# Patient Record
Sex: MALE | Race: OTHER | HISPANIC OR LATINO | URBAN - METROPOLITAN AREA
[De-identification: names, ages, dates, MRNs, and addresses within clinical notes are randomized per-mention and may not be internally consistent; named-entity substitution may affect disease eponyms.]

---

## 2023-05-11 ENCOUNTER — INPATIENT (INPATIENT)
Facility: HOSPITAL | Age: 83
LOS: 2 days | Discharge: HOME | DRG: 603 | End: 2023-05-14
Attending: INTERNAL MEDICINE | Admitting: INTERNAL MEDICINE
Payer: MEDICARE

## 2023-05-11 VITALS
TEMPERATURE: 98 F | WEIGHT: 125 LBS | DIASTOLIC BLOOD PRESSURE: 65 MMHG | SYSTOLIC BLOOD PRESSURE: 121 MMHG | RESPIRATION RATE: 18 BRPM | OXYGEN SATURATION: 98 % | HEART RATE: 93 BPM

## 2023-05-11 DIAGNOSIS — L03.011 CELLULITIS OF RIGHT FINGER: ICD-10-CM

## 2023-05-11 LAB
ALBUMIN SERPL ELPH-MCNC: 3.8 G/DL — SIGNIFICANT CHANGE UP (ref 3.5–5)
ANION GAP SERPL CALC-SCNC: 7 MMOL/L — SIGNIFICANT CHANGE UP (ref 5–17)
APTT BLD: 37.9 SEC — HIGH (ref 27.5–35.5)
BASOPHILS # BLD AUTO: 0.05 K/UL — SIGNIFICANT CHANGE UP (ref 0–0.2)
BASOPHILS NFR BLD AUTO: 0.6 % — SIGNIFICANT CHANGE UP (ref 0–2)
BUN SERPL-MCNC: 28 MG/DL — HIGH (ref 7–18)
CALCIUM SERPL-MCNC: 9.7 MG/DL — SIGNIFICANT CHANGE UP (ref 8.4–10.5)
CHLORIDE SERPL-SCNC: 105 MMOL/L — SIGNIFICANT CHANGE UP (ref 96–108)
CO2 SERPL-SCNC: 25 MMOL/L — SIGNIFICANT CHANGE UP (ref 22–31)
EOSINOPHIL # BLD AUTO: 0.4 K/UL — SIGNIFICANT CHANGE UP (ref 0–0.5)
EOSINOPHIL NFR BLD AUTO: 4.7 % — SIGNIFICANT CHANGE UP (ref 0–6)
GLUCOSE SERPL-MCNC: 184 MG/DL — HIGH (ref 70–99)
HCT VFR BLD CALC: 43.2 % — SIGNIFICANT CHANGE UP (ref 39–50)
HGB BLD-MCNC: 14.4 G/DL — SIGNIFICANT CHANGE UP (ref 13–17)
IMM GRANULOCYTES NFR BLD AUTO: 1.2 % — HIGH (ref 0–0.9)
INR BLD: 1.11 RATIO — SIGNIFICANT CHANGE UP (ref 0.88–1.16)
LACTATE SERPL-SCNC: 1.9 MMOL/L — SIGNIFICANT CHANGE UP (ref 0.7–2)
LYMPHOCYTES # BLD AUTO: 1.51 K/UL — SIGNIFICANT CHANGE UP (ref 1–3.3)
LYMPHOCYTES # BLD AUTO: 17.7 % — SIGNIFICANT CHANGE UP (ref 13–44)
MCHC RBC-ENTMCNC: 29 PG — SIGNIFICANT CHANGE UP (ref 27–34)
MCHC RBC-ENTMCNC: 33.3 GM/DL — SIGNIFICANT CHANGE UP (ref 32–36)
MCV RBC AUTO: 86.9 FL — SIGNIFICANT CHANGE UP (ref 80–100)
MONOCYTES # BLD AUTO: 0.66 K/UL — SIGNIFICANT CHANGE UP (ref 0–0.9)
MONOCYTES NFR BLD AUTO: 7.7 % — SIGNIFICANT CHANGE UP (ref 2–14)
NEUTROPHILS # BLD AUTO: 5.82 K/UL — SIGNIFICANT CHANGE UP (ref 1.8–7.4)
NEUTROPHILS NFR BLD AUTO: 68.1 % — SIGNIFICANT CHANGE UP (ref 43–77)
NRBC # BLD: 0 /100 WBCS — SIGNIFICANT CHANGE UP (ref 0–0)
PLATELET # BLD AUTO: 250 K/UL — SIGNIFICANT CHANGE UP (ref 150–400)
POTASSIUM SERPL-MCNC: 4.4 MMOL/L — SIGNIFICANT CHANGE UP (ref 3.5–5.3)
POTASSIUM SERPL-SCNC: 4.4 MMOL/L — SIGNIFICANT CHANGE UP (ref 3.5–5.3)
PROTHROM AB SERPL-ACNC: 13.2 SEC — SIGNIFICANT CHANGE UP (ref 10.5–13.4)
RBC # BLD: 4.97 M/UL — SIGNIFICANT CHANGE UP (ref 4.2–5.8)
RBC # FLD: 14.2 % — SIGNIFICANT CHANGE UP (ref 10.3–14.5)
SODIUM SERPL-SCNC: 137 MMOL/L — SIGNIFICANT CHANGE UP (ref 135–145)
WBC # BLD: 8.54 K/UL — SIGNIFICANT CHANGE UP (ref 3.8–10.5)
WBC # FLD AUTO: 8.54 K/UL — SIGNIFICANT CHANGE UP (ref 3.8–10.5)

## 2023-05-11 PROCEDURE — 73140 X-RAY EXAM OF FINGER(S): CPT | Mod: 26,RT

## 2023-05-11 PROCEDURE — 99285 EMERGENCY DEPT VISIT HI MDM: CPT

## 2023-05-11 RX ORDER — VANCOMYCIN HCL 1 G
1000 VIAL (EA) INTRAVENOUS ONCE
Refills: 0 | Status: COMPLETED | OUTPATIENT
Start: 2023-05-11 | End: 2023-05-11

## 2023-05-11 RX ORDER — PIPERACILLIN AND TAZOBACTAM 4; .5 G/20ML; G/20ML
3.38 INJECTION, POWDER, LYOPHILIZED, FOR SOLUTION INTRAVENOUS ONCE
Refills: 0 | Status: COMPLETED | OUTPATIENT
Start: 2023-05-11 | End: 2023-05-11

## 2023-05-11 RX ORDER — KETOROLAC TROMETHAMINE 30 MG/ML
15 SYRINGE (ML) INJECTION ONCE
Refills: 0 | Status: DISCONTINUED | OUTPATIENT
Start: 2023-05-11 | End: 2023-05-11

## 2023-05-11 RX ADMIN — Medication 15 MILLIGRAM(S): at 23:24

## 2023-05-11 RX ADMIN — Medication 250 MILLIGRAM(S): at 23:24

## 2023-05-11 RX ADMIN — PIPERACILLIN AND TAZOBACTAM 200 GRAM(S): 4; .5 INJECTION, POWDER, LYOPHILIZED, FOR SOLUTION INTRAVENOUS at 23:24

## 2023-05-11 NOTE — ED PROVIDER NOTE - OBJECTIVE STATEMENT
83 yr old male with hx of HTn and HLD presents to ed c/o persistent right index finger swelling , redness with wound not healing for about 1 week. slightly improve with keflex and Bactroban ointment. pt injured it and noticed persistent pus.

## 2023-05-11 NOTE — ED PROVIDER NOTE - CLINICAL SUMMARY MEDICAL DECISION MAKING FREE TEXT BOX
83 yr old male with hx of HTn and HLD presents to ed c/o persistent right index finger swelling , redness with wound not healing for about 1 week. slightly improve with keflex and Bactroban ointment. pt injured it and noticed persistent pus.     failed outpt oral abx. no sign of [place]. labs, xr, abx, admit

## 2023-05-11 NOTE — ED PROVIDER NOTE - WR INTERPRETED BY 1
Niacinamide Counseling: I recommended taking niacin or niacinamide, also know as vitamin B3, twice daily. Recent evidence suggests that taking vitamin B3 (500 mg twice daily) can reduce the risk of actinic keratoses and non-melanoma skin cancers. Side effects of vitamin B3 include flushing and headache. Alexei Bailey Y

## 2023-05-11 NOTE — ED ADULT TRIAGE NOTE - CHIEF COMPLAINT QUOTE
right index finger swelling , redness with wound not healing for few days as per granddaughter , advised by PMD for labs and IV antibiotics

## 2023-05-11 NOTE — ED ADULT NURSE NOTE - CAS EDN DISCHARGE ASSESSMENT
PT ambulatory, no sign of distress noted./Alert and oriented to person, place and time/Awake/Symptoms improved

## 2023-05-11 NOTE — ED PROVIDER NOTE - MUSCULOSKELETAL, MLM
right index finger erythema, able to fully extend finger but pain with palpation to digit. no tracking or redness.

## 2023-05-11 NOTE — ED ADULT NURSE NOTE - NSFALLUNIVINTERV_ED_ALL_ED
Bed/Stretcher in lowest position, wheels locked, appropriate side rails in place/Call bell, personal items and telephone in reach/Instruct patient to call for assistance before getting out of bed/chair/stretcher/Non-slip footwear applied when patient is off stretcher/[place] to call system/Physically safe environment - no spills, clutter or unnecessary equipment/Purposeful proactive rounding/Room/bathroom lighting operational, light cord in reach

## 2023-05-11 NOTE — ED ADULT NURSE NOTE - OBJECTIVE STATEMENT
PT referred for IV abx, pt has pain, redness, swelling to right index  finger per granddaughter pt has  hx of DM  denies Fever, Chills

## 2023-05-12 DIAGNOSIS — E11.9 TYPE 2 DIABETES MELLITUS WITHOUT COMPLICATIONS: ICD-10-CM

## 2023-05-12 DIAGNOSIS — E78.5 HYPERLIPIDEMIA, UNSPECIFIED: ICD-10-CM

## 2023-05-12 DIAGNOSIS — Z29.9 ENCOUNTER FOR PROPHYLACTIC MEASURES, UNSPECIFIED: ICD-10-CM

## 2023-05-12 DIAGNOSIS — I10 ESSENTIAL (PRIMARY) HYPERTENSION: ICD-10-CM

## 2023-05-12 DIAGNOSIS — N17.9 ACUTE KIDNEY FAILURE, UNSPECIFIED: ICD-10-CM

## 2023-05-12 DIAGNOSIS — L03.90 CELLULITIS, UNSPECIFIED: ICD-10-CM

## 2023-05-12 DIAGNOSIS — Z90.49 ACQUIRED ABSENCE OF OTHER SPECIFIED PARTS OF DIGESTIVE TRACT: Chronic | ICD-10-CM

## 2023-05-12 LAB
ALBUMIN SERPL ELPH-MCNC: 3.6 G/DL — SIGNIFICANT CHANGE UP (ref 3.5–5)
ALP SERPL-CCNC: 85 U/L — SIGNIFICANT CHANGE UP (ref 40–120)
ALP SERPL-CCNC: 86 U/L — SIGNIFICANT CHANGE UP (ref 40–120)
ALT FLD-CCNC: 15 U/L DA — SIGNIFICANT CHANGE UP (ref 10–60)
ALT FLD-CCNC: 15 U/L DA — SIGNIFICANT CHANGE UP (ref 10–60)
ANION GAP SERPL CALC-SCNC: 6 MMOL/L — SIGNIFICANT CHANGE UP (ref 5–17)
AST SERPL-CCNC: 4 U/L — LOW (ref 10–40)
AST SERPL-CCNC: 8 U/L — LOW (ref 10–40)
BASOPHILS # BLD AUTO: 0.05 K/UL — SIGNIFICANT CHANGE UP (ref 0–0.2)
BASOPHILS NFR BLD AUTO: 0.6 % — SIGNIFICANT CHANGE UP (ref 0–2)
BILIRUB SERPL-MCNC: 0.4 MG/DL — SIGNIFICANT CHANGE UP (ref 0.2–1.2)
BILIRUB SERPL-MCNC: 0.6 MG/DL — SIGNIFICANT CHANGE UP (ref 0.2–1.2)
BUN SERPL-MCNC: 27 MG/DL — HIGH (ref 7–18)
CALCIUM SERPL-MCNC: 9.4 MG/DL — SIGNIFICANT CHANGE UP (ref 8.4–10.5)
CHLORIDE SERPL-SCNC: 104 MMOL/L — SIGNIFICANT CHANGE UP (ref 96–108)
CK SERPL-CCNC: 77 U/L — SIGNIFICANT CHANGE UP (ref 35–232)
CO2 SERPL-SCNC: 26 MMOL/L — SIGNIFICANT CHANGE UP (ref 22–31)
CREAT SERPL-MCNC: 1.43 MG/DL — HIGH (ref 0.5–1.3)
CREAT SERPL-MCNC: 1.49 MG/DL — HIGH (ref 0.5–1.3)
CRP SERPL-MCNC: 17 MG/L — HIGH
EGFR: 46 ML/MIN/1.73M2 — LOW
EGFR: 48 ML/MIN/1.73M2 — LOW
EOSINOPHIL # BLD AUTO: 0.24 K/UL — SIGNIFICANT CHANGE UP (ref 0–0.5)
EOSINOPHIL NFR BLD AUTO: 3 % — SIGNIFICANT CHANGE UP (ref 0–6)
ERYTHROCYTE [SEDIMENTATION RATE] IN BLOOD: 16 MM/HR — SIGNIFICANT CHANGE UP (ref 0–20)
GLUCOSE BLDC GLUCOMTR-MCNC: 123 MG/DL — HIGH (ref 70–99)
GLUCOSE BLDC GLUCOMTR-MCNC: 171 MG/DL — HIGH (ref 70–99)
GLUCOSE BLDC GLUCOMTR-MCNC: 188 MG/DL — HIGH (ref 70–99)
GLUCOSE SERPL-MCNC: 180 MG/DL — HIGH (ref 70–99)
HCT VFR BLD CALC: 45.5 % — SIGNIFICANT CHANGE UP (ref 39–50)
HGB BLD-MCNC: 14.8 G/DL — SIGNIFICANT CHANGE UP (ref 13–17)
IMM GRANULOCYTES NFR BLD AUTO: 0.7 % — SIGNIFICANT CHANGE UP (ref 0–0.9)
LYMPHOCYTES # BLD AUTO: 1.23 K/UL — SIGNIFICANT CHANGE UP (ref 1–3.3)
LYMPHOCYTES # BLD AUTO: 15.3 % — SIGNIFICANT CHANGE UP (ref 13–44)
MAGNESIUM SERPL-MCNC: 2.2 MG/DL — SIGNIFICANT CHANGE UP (ref 1.6–2.6)
MCHC RBC-ENTMCNC: 28.5 PG — SIGNIFICANT CHANGE UP (ref 27–34)
MCHC RBC-ENTMCNC: 32.5 GM/DL — SIGNIFICANT CHANGE UP (ref 32–36)
MCV RBC AUTO: 87.5 FL — SIGNIFICANT CHANGE UP (ref 80–100)
MONOCYTES # BLD AUTO: 0.48 K/UL — SIGNIFICANT CHANGE UP (ref 0–0.9)
MONOCYTES NFR BLD AUTO: 6 % — SIGNIFICANT CHANGE UP (ref 2–14)
NEUTROPHILS # BLD AUTO: 5.96 K/UL — SIGNIFICANT CHANGE UP (ref 1.8–7.4)
NEUTROPHILS NFR BLD AUTO: 74.4 % — SIGNIFICANT CHANGE UP (ref 43–77)
NRBC # BLD: 0 /100 WBCS — SIGNIFICANT CHANGE UP (ref 0–0)
PHOSPHATE SERPL-MCNC: 3.4 MG/DL — SIGNIFICANT CHANGE UP (ref 2.5–4.5)
PLATELET # BLD AUTO: 238 K/UL — SIGNIFICANT CHANGE UP (ref 150–400)
POTASSIUM SERPL-MCNC: 4 MMOL/L — SIGNIFICANT CHANGE UP (ref 3.5–5.3)
POTASSIUM SERPL-SCNC: 4 MMOL/L — SIGNIFICANT CHANGE UP (ref 3.5–5.3)
PROT SERPL-MCNC: 7.9 G/DL — SIGNIFICANT CHANGE UP (ref 6–8.3)
PROT SERPL-MCNC: 8 G/DL — SIGNIFICANT CHANGE UP (ref 6–8.3)
RBC # BLD: 5.2 M/UL — SIGNIFICANT CHANGE UP (ref 4.2–5.8)
RBC # FLD: 14.1 % — SIGNIFICANT CHANGE UP (ref 10.3–14.5)
SODIUM SERPL-SCNC: 136 MMOL/L — SIGNIFICANT CHANGE UP (ref 135–145)
WBC # BLD: 8.02 K/UL — SIGNIFICANT CHANGE UP (ref 3.8–10.5)
WBC # FLD AUTO: 8.02 K/UL — SIGNIFICANT CHANGE UP (ref 3.8–10.5)

## 2023-05-12 RX ORDER — ACETAMINOPHEN 500 MG
650 TABLET ORAL EVERY 6 HOURS
Refills: 0 | Status: DISCONTINUED | OUTPATIENT
Start: 2023-05-12 | End: 2023-05-14

## 2023-05-12 RX ORDER — SODIUM CHLORIDE 9 MG/ML
1000 INJECTION, SOLUTION INTRAVENOUS
Refills: 0 | Status: DISCONTINUED | OUTPATIENT
Start: 2023-05-12 | End: 2023-05-14

## 2023-05-12 RX ORDER — DEXTROSE 50 % IN WATER 50 %
25 SYRINGE (ML) INTRAVENOUS ONCE
Refills: 0 | Status: DISCONTINUED | OUTPATIENT
Start: 2023-05-12 | End: 2023-05-14

## 2023-05-12 RX ORDER — ENOXAPARIN SODIUM 100 MG/ML
40 INJECTION SUBCUTANEOUS EVERY 24 HOURS
Refills: 0 | Status: DISCONTINUED | OUTPATIENT
Start: 2023-05-12 | End: 2023-05-14

## 2023-05-12 RX ORDER — DEXTROSE 50 % IN WATER 50 %
15 SYRINGE (ML) INTRAVENOUS ONCE
Refills: 0 | Status: DISCONTINUED | OUTPATIENT
Start: 2023-05-12 | End: 2023-05-14

## 2023-05-12 RX ORDER — AMLODIPINE BESYLATE 2.5 MG/1
5 TABLET ORAL DAILY
Refills: 0 | Status: DISCONTINUED | OUTPATIENT
Start: 2023-05-12 | End: 2023-05-14

## 2023-05-12 RX ORDER — INSULIN LISPRO 100/ML
VIAL (ML) SUBCUTANEOUS
Refills: 0 | Status: DISCONTINUED | OUTPATIENT
Start: 2023-05-12 | End: 2023-05-14

## 2023-05-12 RX ORDER — ONDANSETRON 8 MG/1
4 TABLET, FILM COATED ORAL EVERY 8 HOURS
Refills: 0 | Status: DISCONTINUED | OUTPATIENT
Start: 2023-05-12 | End: 2023-05-14

## 2023-05-12 RX ORDER — LANOLIN ALCOHOL/MO/W.PET/CERES
3 CREAM (GRAM) TOPICAL AT BEDTIME
Refills: 0 | Status: DISCONTINUED | OUTPATIENT
Start: 2023-05-12 | End: 2023-05-14

## 2023-05-12 RX ORDER — VANCOMYCIN HCL 1 G
500 VIAL (EA) INTRAVENOUS EVERY 24 HOURS
Refills: 0 | Status: DISCONTINUED | OUTPATIENT
Start: 2023-05-12 | End: 2023-05-14

## 2023-05-12 RX ORDER — ASPIRIN/CALCIUM CARB/MAGNESIUM 324 MG
81 TABLET ORAL DAILY
Refills: 0 | Status: DISCONTINUED | OUTPATIENT
Start: 2023-05-12 | End: 2023-05-14

## 2023-05-12 RX ORDER — ATORVASTATIN CALCIUM 80 MG/1
20 TABLET, FILM COATED ORAL AT BEDTIME
Refills: 0 | Status: DISCONTINUED | OUTPATIENT
Start: 2023-05-12 | End: 2023-05-14

## 2023-05-12 RX ORDER — GLUCAGON INJECTION, SOLUTION 0.5 MG/.1ML
1 INJECTION, SOLUTION SUBCUTANEOUS ONCE
Refills: 0 | Status: DISCONTINUED | OUTPATIENT
Start: 2023-05-12 | End: 2023-05-14

## 2023-05-12 RX ORDER — INSULIN LISPRO 100/ML
VIAL (ML) SUBCUTANEOUS AT BEDTIME
Refills: 0 | Status: DISCONTINUED | OUTPATIENT
Start: 2023-05-12 | End: 2023-05-14

## 2023-05-12 RX ORDER — DEXTROSE 50 % IN WATER 50 %
12.5 SYRINGE (ML) INTRAVENOUS ONCE
Refills: 0 | Status: DISCONTINUED | OUTPATIENT
Start: 2023-05-12 | End: 2023-05-14

## 2023-05-12 RX ADMIN — Medication 100 MILLIGRAM(S): at 22:01

## 2023-05-12 RX ADMIN — ENOXAPARIN SODIUM 40 MILLIGRAM(S): 100 INJECTION SUBCUTANEOUS at 12:59

## 2023-05-12 RX ADMIN — ATORVASTATIN CALCIUM 20 MILLIGRAM(S): 80 TABLET, FILM COATED ORAL at 22:00

## 2023-05-12 RX ADMIN — Medication 1: at 17:53

## 2023-05-12 RX ADMIN — Medication 81 MILLIGRAM(S): at 12:59

## 2023-05-12 RX ADMIN — Medication 15 MILLIGRAM(S): at 05:27

## 2023-05-12 NOTE — PATIENT PROFILE ADULT - FALL HARM RISK - HARM RISK INTERVENTIONS

## 2023-05-12 NOTE — CONSULT NOTE ADULT - SUBJECTIVE AND OBJECTIVE BOX
HPI:  Patient is an 83-year-old male PMH HTN, DM, HLD, who comes in complaining of right index, finger, swelling, and purulent drainage.    Patient states that eight days ago he injured the medial aspect of the right index finger while trying to close the deodorant, got injured by the cap.    Since then, he developed a small cut that was associated with swelling throughout the forearm. Visited his PCP, who prescribed an antibiotic,  keflex and Bactroban ointment. Patient started taking the antibiotic two days ago, the swelling and redness around the arm reduce, however, the redness around her cut site persisted with small amount of purulent drainage from think his visit to the ED.   Denies any fever, chills, nausea, vomiting, denies pain in the finger    In the ED HD stable and afebrile  Exam, notable for small cut in the medial aspect of the index finger, with redness throughout the index finger and made your right hand.  Labs significant for RODOLFO BUN/creatinine, 28/1.4 (12 May 2023 01:48)      PAST MEDICAL & SURGICAL HISTORY:  HTN (hypertension)      HLD (hyperlipidemia)      DM (diabetes mellitus)      S/P cholecystectomy          No Known Allergies      Meds:  acetaminophen     Tablet .. 650 milliGRAM(s) Oral every 6 hours PRN  aluminum hydroxide/magnesium hydroxide/simethicone Suspension 30 milliLiter(s) Oral every 4 hours PRN  amLODIPine   Tablet 5 milliGRAM(s) Oral daily  aspirin  chewable 81 milliGRAM(s) Oral daily  atorvastatin 20 milliGRAM(s) Oral at bedtime  dextrose 5%. 1000 milliLiter(s) IV Continuous <Continuous>  dextrose 5%. 1000 milliLiter(s) IV Continuous <Continuous>  dextrose 50% Injectable 25 Gram(s) IV Push once  dextrose 50% Injectable 12.5 Gram(s) IV Push once  dextrose 50% Injectable 25 Gram(s) IV Push once  dextrose Oral Gel 15 Gram(s) Oral once PRN  enoxaparin Injectable 40 milliGRAM(s) SubCutaneous every 24 hours  glucagon  Injectable 1 milliGRAM(s) IntraMuscular once  insulin lispro (ADMELOG) corrective regimen sliding scale   SubCutaneous at bedtime  insulin lispro (ADMELOG) corrective regimen sliding scale   SubCutaneous three times a day before meals  melatonin 3 milliGRAM(s) Oral at bedtime PRN  ondansetron Injectable 4 milliGRAM(s) IV Push every 8 hours PRN  vancomycin  IVPB 500 milliGRAM(s) IV Intermittent every 24 hours      SOCIAL HISTORY:  Smoker:  YES / NO        PACK YEARS:                         WHEN QUIT?  ETOH use:  YES / NO               FREQUENCY / QUANTITY:  [place] Drug use:  YES / NO  Occupation:  Assisted device use (Cane / Walker):  Live with:    FAMILY HISTORY:  No pertinent family history in first degree relatives        VITALS:  Vital Signs Last 24 Hrs  T(C): 36.1 (12 May 2023 12:10), Max: 36.4 (11 May 2023 20:36)  T(F): 97 (12 May 2023 12:10), Max: 97.5 (11 May 2023 20:36)  HR: 79 (12 May 2023 12:10) (74 - 93)  BP: 134/68 (12 May 2023 12:10) (119/63 - 152/73)  BP(mean): --  RR: 15 (12 May 2023 12:10) (15 - 18)  SpO2: 98% (12 May 2023 12:10) (97% - 100%)    Parameters below as of 12 May 2023 12:10  Patient On (Oxygen Delivery Method): room air        LABS/DIAGNOSTIC TESTS:                          14.8   8.02  )-----------( 238      ( 12 May 2023 10:20 )             45.5     WBC Count: 8.02 K/uL (05-12 @ 10:20)  WBC Count: 8.54 K/uL (05-11 @ 23:15)      05-12    136  |  104  |  27<H>  ----------------------------<  180<H>  4.0   |  26  |  1.49<H>    Ca    9.4      12 May 2023 10:20  Phos  3.4     05-12  Mg     2.2     05-12    TPro  7.9  /  Alb  3.6  /  TBili  0.6  /  DBili  x   /  AST  8<L>  /  ALT  15  /  AlkPhos  85  05-12          LIVER FUNCTIONS - ( 12 May 2023 10:20 )  Alb: 3.6 g/dL / Pro: 7.9 g/dL / ALK PHOS: 85 U/L / ALT: 15 U/L DA / AST: 8 U/L / GGT: x             PT/INR - ( 11 May 2023 23:15 )   PT: 13.2 sec;   INR: 1.11 ratio         PTT - ( 11 May 2023 23:15 )  PTT:37.9 sec    LACTATE:Lactate, Blood: 1.9 mmol/L (05-11 @ 23:15)      ABG -     CULTURES:       RADIOLOGY:< from: Xray Finger, Right Hand (05.11.23 @ 23:16) >  ACC: [id number] EXAM:  XR FINGER(S) MIN 2 VIEWS RT   ORDERED BY: BRYANNA GIBSON     PROCEDURE DATE:  05/11/2023          INTERPRETATION:  XR FINGERS HAND RIGHT    Clinical History: Index finger abscess    AP and lateral view right second digit      FINDINGS:    There is no fracture, dislocation or joint effusion.  No degenerative changes.  No radiopaque foreign body.    IMPRESSION:  1.  No fracture.  2.  Moderate generalized swelling. No subcutaneous emphysema or plain   film evidence of osteomyelitis.    --- End of Report ---             MARK ANTHONY JONES DO; Attending Radiologist  This document has been electronically signed. May 12 2023  9:46AM    < end of copied text >        ROS  [  ] UNABLE TO ELICIT               HPI:  Patient is an 83-year-old male PMH HTN, DM, HLD, who comes in complaining of right index, finger, swelling, and purulent drainage.    Patient states that eight days ago he injured the medial aspect of the right index finger while trying to close the deodorant, got injured by the cap.    Since then, he developed a small cut that was associated with swelling throughout the forearm. Visited his PCP, who prescribed an antibiotic,  keflex and Bactroban ointment. Patient started taking the antibiotic two days ago, the swelling and redness around the arm reduce, however, the redness around her cut site persisted with small amount of purulent drainage from think his visit to the ED.   Denies any fever, chills, nausea, vomiting, denies pain in the finger    In the ED HD stable and afebrile  Exam, notable for small cut in the medial aspect of the index finger, with redness throughout the index finger and made your right hand.  Labs significant for RODOLFO BUN/creatinine, 28/1.4 (12 May 2023 01:48)        History as above, asked to see this patient who presented to the hospital with a right index finger cellulitis and ulcer after injuring his finger about 1 week before coming to the hospital. He has pain  in the finger and difficulty bending it. He has no fevers or chills, no other complaints.        PAST MEDICAL & SURGICAL HISTORY:  HTN (hypertension)      HLD (hyperlipidemia)      DM (diabetes mellitus)      S/P cholecystectomy          No Known Allergies      Meds:  acetaminophen     Tablet .. 650 milliGRAM(s) Oral every 6 hours PRN  aluminum hydroxide/magnesium hydroxide/simethicone Suspension 30 milliLiter(s) Oral every 4 hours PRN  amLODIPine   Tablet 5 milliGRAM(s) Oral daily  aspirin  chewable 81 milliGRAM(s) Oral daily  atorvastatin 20 milliGRAM(s) Oral at bedtime  dextrose 5%. 1000 milliLiter(s) IV Continuous <Continuous>  dextrose 5%. 1000 milliLiter(s) IV Continuous <Continuous>  dextrose 50% Injectable 25 Gram(s) IV Push once  dextrose 50% Injectable 12.5 Gram(s) IV Push once  dextrose 50% Injectable 25 Gram(s) IV Push once  dextrose Oral Gel 15 Gram(s) Oral once PRN  enoxaparin Injectable 40 milliGRAM(s) SubCutaneous every 24 hours  glucagon  Injectable 1 milliGRAM(s) IntraMuscular once  insulin lispro (ADMELOG) corrective regimen sliding scale   SubCutaneous at bedtime  insulin lispro (ADMELOG) corrective regimen sliding scale   SubCutaneous three times a day before meals  melatonin 3 milliGRAM(s) Oral at bedtime PRN  ondansetron Injectable 4 milliGRAM(s) IV Push every 8 hours PRN  vancomycin  IVPB 500 milliGRAM(s) IV Intermittent every 24 hours      SOCIAL HISTORY:  Smoker:  no  ETOH use:  no      FAMILY HISTORY:  No pertinent family history in first degree relatives        VITALS:  Vital Signs Last 24 Hrs  T(C): 36.1 (12 May 2023 12:10), Max: 36.4 (11 May 2023 20:36)  T(F): 97 (12 May 2023 12:10), Max: 97.5 (11 May 2023 20:36)  HR: 79 (12 May 2023 12:10) (74 - 93)  BP: 134/68 (12 May 2023 12:10) (119/63 - 152/73)  BP(mean): --  RR: 15 (12 May 2023 12:10) (15 - 18)  SpO2: 98% (12 May 2023 12:10) (97% - 100%)    Parameters below as of 12 May 2023 12:10  Patient On (Oxygen Delivery Method): room air        LABS/DIAGNOSTIC TESTS:                          14.8   8.02  )-----------( 238      ( 12 May 2023 10:20 )             45.5     WBC Count: 8.02 K/uL (05-12 @ 10:20)  WBC Count: 8.54 K/uL (05-11 @ 23:15)      05-12    136  |  104  |  27<H>  ----------------------------<  180<H>  4.0   |  26  |  1.49<H>    Ca    9.4      12 May 2023 10:20  Phos  3.4     05-12  Mg     2.2     05-12    TPro  7.9  /  Alb  3.6  /  TBili  0.6  /  DBili  x   /  AST  8<L>  /  ALT  15  /  AlkPhos  85  05-12          LIVER FUNCTIONS - ( 12 May 2023 10:20 )  Alb: 3.6 g/dL / Pro: 7.9 g/dL / ALK PHOS: 85 U/L / ALT: 15 U/L DA / AST: 8 U/L / GGT: x             PT/INR - ( 11 May 2023 23:15 )   PT: 13.2 sec;   INR: 1.11 ratio         PTT - ( 11 May 2023 23:15 )  PTT:37.9 sec    LACTATE:Lactate, Blood: 1.9 mmol/L (05-11 @ 23:15)      ABG -     CULTURES:       RADIOLOGY:< from: Xray Finger, Right Hand (05.11.23 @ 23:16) >  ACC: [id number] EXAM:  XR FINGER(S) MIN 2 VIEWS RT   ORDERED BY: BRYANNA GIBSON     PROCEDURE DATE:  05/11/2023          INTERPRETATION:  XR FINGERS HAND RIGHT    Clinical History: Index finger abscess    AP and lateral view right second digit      FINDINGS:    There is no fracture, dislocation or joint effusion.  No degenerative changes.  No radiopaque foreign body.    IMPRESSION:  1.  No fracture.  2.  Moderate generalized swelling. No subcutaneous emphysema or plain   film evidence of osteomyelitis.    --- End of Report ---             MARK ANTHONY JONES DO; Attending Radiologist  This document has been electronically signed. May 12 2023  9:46AM    < end of copied text >        ROS  [  ] UNABLE TO ELICIT

## 2023-05-12 NOTE — H&P ADULT - PROBLEM SELECTOR PLAN 1
Comes in with  right index, finger, swelling, and purulent drainage. after injury 8 days ago  took keflex the last 2 days with minimal improvement    -admitted for IV abx,  -s/p vanc and zosyn in the ED  -f/u would cultures, blood culrures  -c/w abx vanc

## 2023-05-12 NOTE — PATIENT PROFILE ADULT - HAVE YOU RECENTLY LOST WEIGHT WITHOUT TRYING?
Called patient. No answer. Left message on answering machine to call back. PSR if patient calls back please give patient X-rays instructions below. TEAM: continue to check status of X-rays. No (0)

## 2023-05-12 NOTE — H&P ADULT - ASSESSMENT
Patient is an 83-year-old male PMH HTN, DM, HLD, who comes in complaining of right index, finger, swelling, and purulent drainage admitted for cellulitis

## 2023-05-12 NOTE — ED ADULT NURSE REASSESSMENT NOTE - NSFALLUNIVINTERV_ED_ALL_ED
Bed/Stretcher in lowest position, wheels locked, appropriate side rails in place/Call bell, personal items and telephone in reach/Instruct patient to call for assistance before getting out of bed/chair/stretcher/Non-slip footwear applied when patient is off stretcher/[place] to call system/Physically safe environment - no spills, clutter or unnecessary equipment/Purposeful proactive rounding/Room/bathroom lighting operational, light cord in reach

## 2023-05-12 NOTE — H&P ADULT - NSHPPHYSICALEXAM_GEN_ALL_CORE
T(C): 36.3 (05-12-23 @ 04:25), Max: 36.4 (05-11-23 @ 20:36)  T(F): 97.3 (05-12-23 @ 04:25), Max: 97.5 (05-11-23 @ 20:36)  HR: 74 (05-12-23 @ 04:25) (74 - 93)  BP: 119/63 (05-12-23 @ 04:25) (119/63 - 152/73)  RR: 18 (05-12-23 @ 04:25) (18 - 18)  SpO2: 99% (05-12-23 @ 04:25) (97% - 99%)    GENERAL: NAD, lying in bed comfortably  HEAD:  Atraumatic, Normocephalic  EYES: EOMI, PERRLA, conjunctiva and sclera clear  ENT: Moist mucous membranes  NECK: Supple, No JVD  CHEST/LUNG: Clear to auscultation bilaterally; No rales, rhonchi, wheezing, or rubs. Unlabored respirations  HEART: Regular rate and rhythm; No murmurs, rubs, or gallops  ABDOMEN: Bowel sounds present; Soft, Nontender, Nondistended. No hepatomegally  EXTREMITIES:  2+ Peripheral Pulses, brisk capillary refill. No clubbing, cyanosis, or edema  NERVOUS SYSTEM:  Alert & Oriented X3, speech clear. No deficits   MSK: FROM all 4 extremities, full and equal strength  SKIN: small cut in the medial aspect of the index finger, with redness throughout the index finger and made your right hand.

## 2023-05-12 NOTE — H&P ADULT - HISTORY OF PRESENT ILLNESS
Patient is an 83-year-old male PMH HTN, DM, HLD, who comes in complaining of right index, finger, swelling, and purulent drainage.    Patient states that eight days ago she injured the medial aspect of the right index finger while trying to close the deodorant, got injured by the cap.    Since then, he developed a small cut that was associated with swelling throughout the forearm. Visited his PCP, who prescribed an antibiotic,  keflex and Bactroban ointment. Patient started taking the antibiotic two days ago, the swelling and redness around the arm reduce, however, the redness around her cut site persisted with small amount of purulent drainage from think his visit to the ED.   Denies any fever, chills, nausea, vomiting, denies pain in the finger    In the ED HD stable and afebrile  Exam, notable for small cut in the medial aspect of the index finger, with redness throughout the index finger and made your right hand.  Labs significant for RODOLFO BUN/creatinine, 28/1.4 Patient is an 83-year-old male PMH HTN, DM, HLD, who comes in complaining of right index, finger, swelling, and purulent drainage.    Patient states that eight days ago he injured the medial aspect of the right index finger while trying to close the deodorant, got injured by the cap.    Since then, he developed a small cut that was associated with swelling throughout the forearm. Visited his PCP, who prescribed an antibiotic,  keflex and Bactroban ointment. Patient started taking the antibiotic two days ago, the swelling and redness around the arm reduce, however, the redness around her cut site persisted with small amount of purulent drainage from think his visit to the ED.   Denies any fever, chills, nausea, vomiting, denies pain in the finger    In the ED HD stable and afebrile  Exam, notable for small cut in the medial aspect of the index finger, with redness throughout the index finger and made your right hand.  Labs significant for RODOLFO BUN/creatinine, 28/1.4

## 2023-05-12 NOTE — CONSULT NOTE ADULT - ASSESSMENT
Right Index finger Cellulitis      Plan - Cont Vancomycin 500mgs iv q24hrs for now  if improving then will plan to DC home on Sunday on Doxycycline 100mgs po bid x 10 days

## 2023-05-12 NOTE — H&P ADULT - NSHPLABSRESULTS_GEN_ALL_CORE
14.4   8.54  )-----------( 250      ( 11 May 2023 23:15 )             43.2     05-11    137  |  105  |  28<H>  ----------------------------<  184<H>  4.4   |  25  |  1.43<H>    Ca    9.7      11 May 2023 23:15    TPro  8.0  /  Alb  3.8  /  TBili  0.4  /  DBili  x   /  AST  4<L>  /  ALT  15  /  AlkPhos  86  05-11    CARDIAC MARKERS ( 11 May 2023 23:15 )  x     / x     / 77 U/L / x     / x          LIVER FUNCTIONS - ( 11 May 2023 23:15 )  Alb: 3.8 g/dL / Pro: 8.0 g/dL / ALK PHOS: 86 U/L / ALT: 15 U/L DA / AST: 4 U/L / GGT: x           PT/INR - ( 11 May 2023 23:15 )   PT: 13.2 sec;   INR: 1.11 ratio         PTT - ( 11 May 2023 23:15 )  PTT:37.9 sec      Lactate, Blood: 1.9 mmol/L (05-11 @ 23:15)          RADIOLOGY STUDIES:    XR right hand: pending read

## 2023-05-12 NOTE — H&P ADULT - ATTENDING COMMENTS
Patient is an 83-year-old male PMH HTN, DM, HLD, who comes in complaining of right index, finger, swelling, and purulent drainage.    Patient states that eight days ago he injured the medial aspect of the right index finger while trying to close the deodorant, got injured by the cap.    Since then, he developed a small cut that was associated with swelling throughout the forearm. Visited his PCP, who prescribed an antibiotic,  keflex and Bactroban ointment. Patient started taking the antibiotic two days ago, the swelling and redness around the arm reduce, however, the redness around her cut site persisted with small amount of purulent drainage from think his visit to the ED.   Denies any fever, chills, nausea, vomiting, denies pain in the finger    In the ED HD stable and afebrile  Exam, notable for small cut in the medial aspect of the index finger, with redness throughout the index finger and made your right hand.  Labs significant for RODOLFO BUN/creatinine, 28/1.4      assessment  --  right index finger and hand cellulitis, rodolfo, h/o HTN, DM, HLD    plan  --  admit to med, vanco, lispro ss, hold metformin, cont preadmit home meds, gi and dvt prophylaxis, ivf   cbc, bmp, mg, phos, lipids, tsh, bld cx, wound cx,      id cons

## 2023-05-12 NOTE — H&P ADULT - PROBLEM SELECTOR PLAN 2
Comes in for cellulits  Found to have RODOLFO,   BUN / Cr ration > 20  likely pre-renal in the setting of infection  c/w gentle hydration  avoid NSAIDs and nephrotoxic agents , hold home metformin  monitor CMP daily for improvement.  f/u BMP daily

## 2023-05-13 LAB
A1C WITH ESTIMATED AVERAGE GLUCOSE RESULT: 7.1 % — HIGH (ref 4–5.6)
ANION GAP SERPL CALC-SCNC: 8 MMOL/L — SIGNIFICANT CHANGE UP (ref 5–17)
BUN SERPL-MCNC: 25 MG/DL — HIGH (ref 7–18)
CALCIUM SERPL-MCNC: 9.6 MG/DL — SIGNIFICANT CHANGE UP (ref 8.4–10.5)
CHLORIDE SERPL-SCNC: 107 MMOL/L — SIGNIFICANT CHANGE UP (ref 96–108)
CO2 SERPL-SCNC: 22 MMOL/L — SIGNIFICANT CHANGE UP (ref 22–31)
CREAT SERPL-MCNC: 1.28 MG/DL — SIGNIFICANT CHANGE UP (ref 0.5–1.3)
EGFR: 55 ML/MIN/1.73M2 — LOW
ESTIMATED AVERAGE GLUCOSE: 157 MG/DL — HIGH (ref 68–114)
GLUCOSE BLDC GLUCOMTR-MCNC: 123 MG/DL — HIGH (ref 70–99)
GLUCOSE BLDC GLUCOMTR-MCNC: 187 MG/DL — HIGH (ref 70–99)
GLUCOSE BLDC GLUCOMTR-MCNC: 204 MG/DL — HIGH (ref 70–99)
GLUCOSE BLDC GLUCOMTR-MCNC: 224 MG/DL — HIGH (ref 70–99)
GLUCOSE SERPL-MCNC: 136 MG/DL — HIGH (ref 70–99)
HCT VFR BLD CALC: 44.4 % — SIGNIFICANT CHANGE UP (ref 39–50)
HGB BLD-MCNC: 14.3 G/DL — SIGNIFICANT CHANGE UP (ref 13–17)
MCHC RBC-ENTMCNC: 28.4 PG — SIGNIFICANT CHANGE UP (ref 27–34)
MCHC RBC-ENTMCNC: 32.2 GM/DL — SIGNIFICANT CHANGE UP (ref 32–36)
MCV RBC AUTO: 88.1 FL — SIGNIFICANT CHANGE UP (ref 80–100)
NRBC # BLD: 0 /100 WBCS — SIGNIFICANT CHANGE UP (ref 0–0)
PLATELET # BLD AUTO: 239 K/UL — SIGNIFICANT CHANGE UP (ref 150–400)
POTASSIUM SERPL-MCNC: 4.2 MMOL/L — SIGNIFICANT CHANGE UP (ref 3.5–5.3)
POTASSIUM SERPL-SCNC: 4.2 MMOL/L — SIGNIFICANT CHANGE UP (ref 3.5–5.3)
RBC # BLD: 5.04 M/UL — SIGNIFICANT CHANGE UP (ref 4.2–5.8)
RBC # FLD: 13.9 % — SIGNIFICANT CHANGE UP (ref 10.3–14.5)
SODIUM SERPL-SCNC: 137 MMOL/L — SIGNIFICANT CHANGE UP (ref 135–145)
WBC # BLD: 6.8 K/UL — SIGNIFICANT CHANGE UP (ref 3.8–10.5)
WBC # FLD AUTO: 6.8 K/UL — SIGNIFICANT CHANGE UP (ref 3.8–10.5)

## 2023-05-13 PROCEDURE — 73200 CT UPPER EXTREMITY W/O DYE: CPT | Mod: 26,RT

## 2023-05-13 RX ADMIN — Medication 2: at 17:37

## 2023-05-13 RX ADMIN — Medication 81 MILLIGRAM(S): at 12:24

## 2023-05-13 RX ADMIN — Medication 2: at 12:23

## 2023-05-13 RX ADMIN — AMLODIPINE BESYLATE 5 MILLIGRAM(S): 2.5 TABLET ORAL at 06:12

## 2023-05-13 RX ADMIN — ATORVASTATIN CALCIUM 20 MILLIGRAM(S): 80 TABLET, FILM COATED ORAL at 21:50

## 2023-05-13 RX ADMIN — Medication 100 MILLIGRAM(S): at 21:50

## 2023-05-13 RX ADMIN — ENOXAPARIN SODIUM 40 MILLIGRAM(S): 100 INJECTION SUBCUTANEOUS at 12:24

## 2023-05-13 NOTE — PROGRESS NOTE ADULT - SUBJECTIVE AND OBJECTIVE BOX
Patient is a 83y old  Male who presents with a chief complaint of Index finger cellulitis (12 May 2023 18:32)    pt seen in icu [  ], reg med floor [   ], bed [  ], chair at bedside [   ], a+o x3 [  ], lethargic [  ],  nad [  ]    leonardo [  ], ngt [  ], peg [  ], et tube [  ], cent line [  ], picc line [  ]        Allergies    No Known Allergies        Vitals    T(F): 98.1 (05-13-23 @ 05:10), Max: 98.1 (05-13-23 @ 05:10)  HR: 75 (05-13-23 @ 05:10) (71 - 83)  BP: 120/59 (05-13-23 @ 05:10) (120/59 - 151/66)  RR: 18 (05-13-23 @ 05:10) (15 - 18)  SpO2: 100% (05-13-23 @ 05:10) (98% - 100%)  Wt(kg): --  CAPILLARY BLOOD GLUCOSE      POCT Blood Glucose.: 123 mg/dL (12 May 2023 21:37)      Labs                          14.8   8.02  )-----------( 238      ( 12 May 2023 10:20 )             45.5       05-12    136  |  104  |  27<H>  ----------------------------<  180<H>  4.0   |  26  |  1.49<H>    Ca    9.4      12 May 2023 10:20  Phos  3.4     05-12  Mg     2.2     05-12    TPro  7.9  /  Alb  3.6  /  TBili  0.6  /  DBili  x   /  AST  8<L>  /  ALT  15  /  AlkPhos  85  05-12      CARDIAC MARKERS ( 11 May 2023 23:15 )  x     / x     / 77 U/L / x     / x            .Blood Blood-Peripheral  05-11 @ 23:09   No growth to date.  --  --      .Blood Blood-Peripheral  05-11 @ 23:00   No growth to date.  --  --          Radiology Results      Meds    MEDICATIONS  (STANDING):  amLODIPine   Tablet 5 milliGRAM(s) Oral daily  aspirin  chewable 81 milliGRAM(s) Oral daily  atorvastatin 20 milliGRAM(s) Oral at bedtime  dextrose 5%. 1000 milliLiter(s) (50 mL/Hr) IV Continuous <Continuous>  dextrose 5%. 1000 milliLiter(s) (100 mL/Hr) IV Continuous <Continuous>  dextrose 50% Injectable 25 Gram(s) IV Push once  dextrose 50% Injectable 12.5 Gram(s) IV Push once  dextrose 50% Injectable 25 Gram(s) IV Push once  enoxaparin Injectable 40 milliGRAM(s) SubCutaneous every 24 hours  glucagon  Injectable 1 milliGRAM(s) IntraMuscular once  insulin lispro (ADMELOG) corrective regimen sliding scale   SubCutaneous three times a day before meals  insulin lispro (ADMELOG) corrective regimen sliding scale   SubCutaneous at bedtime  vancomycin  IVPB 500 milliGRAM(s) IV Intermittent every 24 hours      MEDICATIONS  (PRN):  acetaminophen     Tablet .. 650 milliGRAM(s) Oral every 6 hours PRN Temp greater or equal to 38C (100.4F), Mild Pain (1 - 3)  aluminum hydroxide/magnesium hydroxide/simethicone Suspension 30 milliLiter(s) Oral every 4 hours PRN Dyspepsia  dextrose Oral Gel 15 Gram(s) Oral once PRN Blood Glucose LESS THAN 70 milliGRAM(s)/deciliter  melatonin 3 milliGRAM(s) Oral at bedtime PRN Insomnia  ondansetron Injectable 4 milliGRAM(s) IV Push every 8 hours PRN Nausea and/or Vomiting      Physical Exam    Neuro :  no focal deficits  Respiratory: CTA B/L  CV: RRR, S1S2, no murmurs,   Abdominal: Soft, NT, ND +BS,  Extremities: No edema, + peripheral pulses    ASSESSMENT    right index finger and hand cellulitis,   kena,   h/o HTN, DM, HLD      Cellulitis of finger of right hand    HTN (hypertension)    HLD (hyperlipidemia)    DM (diabetes mellitus)    S/P cholecystectomy        PLAN    admit to med,   vanco,   lispro ss,   hold metformin,   cont preadmit home meds,   gi and dvt prophylaxis,   ivf   cbc,   bmp,   mg,   phos,   lipids,   tsh,   bld cx,   wound cx,      id cons .       Patient is a 83y old  Male who presents with a chief complaint of Index finger cellulitis (12 May 2023 18:32)    pt seen in icu [  ], reg med floor [  x ], bed [ x ], chair at bedside [   ], a+o x3 [x  ], lethargic [  ],  nad [ x ]      Allergies    No Known Allergies        Vitals    T(F): 98.1 (05-13-23 @ 05:10), Max: 98.1 (05-13-23 @ 05:10)  HR: 75 (05-13-23 @ 05:10) (71 - 83)  BP: 120/59 (05-13-23 @ 05:10) (120/59 - 151/66)  RR: 18 (05-13-23 @ 05:10) (15 - 18)  SpO2: 100% (05-13-23 @ 05:10) (98% - 100%)  Wt(kg): --  CAPILLARY BLOOD GLUCOSE      POCT Blood Glucose.: 123 mg/dL (12 May 2023 21:37)      Labs                          14.8   8.02  )-----------( 238      ( 12 May 2023 10:20 )             45.5       05-12    136  |  104  |  27<H>  ----------------------------<  180<H>  4.0   |  26  |  1.49<H>    Ca    9.4      12 May 2023 10:20  Phos  3.4     05-12  Mg     2.2     05-12    TPro  7.9  /  Alb  3.6  /  TBili  0.6  /  DBili  x   /  AST  8<L>  /  ALT  15  /  AlkPhos  85  05-12      CARDIAC MARKERS ( 11 May 2023 23:15 )  x     / x     / 77 U/L / x     / x            .Blood Blood-Peripheral  05-11 @ 23:09   No growth to date.  --  --      .Blood Blood-Peripheral  05-11 @ 23:00   No growth to date.  --  --          Radiology Results      Meds    MEDICATIONS  (STANDING):  amLODIPine   Tablet 5 milliGRAM(s) Oral daily  aspirin  chewable 81 milliGRAM(s) Oral daily  atorvastatin 20 milliGRAM(s) Oral at bedtime  dextrose 5%. 1000 milliLiter(s) (50 mL/Hr) IV Continuous <Continuous>  dextrose 5%. 1000 milliLiter(s) (100 mL/Hr) IV Continuous <Continuous>  dextrose 50% Injectable 25 Gram(s) IV Push once  dextrose 50% Injectable 12.5 Gram(s) IV Push once  dextrose 50% Injectable 25 Gram(s) IV Push once  enoxaparin Injectable 40 milliGRAM(s) SubCutaneous every 24 hours  glucagon  Injectable 1 milliGRAM(s) IntraMuscular once  insulin lispro (ADMELOG) corrective regimen sliding scale   SubCutaneous three times a day before meals  insulin lispro (ADMELOG) corrective regimen sliding scale   SubCutaneous at bedtime  vancomycin  IVPB 500 milliGRAM(s) IV Intermittent every 24 hours      MEDICATIONS  (PRN):  acetaminophen     Tablet .. 650 milliGRAM(s) Oral every 6 hours PRN Temp greater or equal to 38C (100.4F), Mild Pain (1 - 3)  aluminum hydroxide/magnesium hydroxide/simethicone Suspension 30 milliLiter(s) Oral every 4 hours PRN Dyspepsia  dextrose Oral Gel 15 Gram(s) Oral once PRN Blood Glucose LESS THAN 70 milliGRAM(s)/deciliter  melatonin 3 milliGRAM(s) Oral at bedtime PRN Insomnia  ondansetron Injectable 4 milliGRAM(s) IV Push every 8 hours PRN Nausea and/or Vomiting      Physical Exam    Neuro :  no focal deficits  Respiratory: CTA B/L  CV: RRR, S1S2, no murmurs,   Abdominal: Soft, NT, ND +BS,  Extremities: No edema, + peripheral pulses, right index finger erythematous, tender with pus      ASSESSMENT    right index finger cellulitis with possible abscess,   kena,   h/o HTN,   DM,   HLD  S/P cholecystectomy        PLAN    blood cx neg noted above  cont vanco,   id f/u  f/u wound cx   ct finger/ hand to r/o abscess   cont ivf  f/u labs   lispro ss,   hold metformin,   cont current meds

## 2023-05-14 VITALS
OXYGEN SATURATION: 100 % | HEART RATE: 72 BPM | RESPIRATION RATE: 17 BRPM | SYSTOLIC BLOOD PRESSURE: 132 MMHG | DIASTOLIC BLOOD PRESSURE: 73 MMHG | TEMPERATURE: 98 F

## 2023-05-14 LAB
ANION GAP SERPL CALC-SCNC: 3 MMOL/L — LOW (ref 5–17)
BUN SERPL-MCNC: 21 MG/DL — HIGH (ref 7–18)
CALCIUM SERPL-MCNC: 9.1 MG/DL — SIGNIFICANT CHANGE UP (ref 8.4–10.5)
CHLORIDE SERPL-SCNC: 107 MMOL/L — SIGNIFICANT CHANGE UP (ref 96–108)
CO2 SERPL-SCNC: 26 MMOL/L — SIGNIFICANT CHANGE UP (ref 22–31)
CREAT SERPL-MCNC: 1.4 MG/DL — HIGH (ref 0.5–1.3)
EGFR: 49 ML/MIN/1.73M2 — LOW
GLUCOSE BLDC GLUCOMTR-MCNC: 155 MG/DL — HIGH (ref 70–99)
GLUCOSE BLDC GLUCOMTR-MCNC: 255 MG/DL — HIGH (ref 70–99)
GLUCOSE SERPL-MCNC: 269 MG/DL — HIGH (ref 70–99)
HCT VFR BLD CALC: 42.9 % — SIGNIFICANT CHANGE UP (ref 39–50)
HGB BLD-MCNC: 14 G/DL — SIGNIFICANT CHANGE UP (ref 13–17)
MCHC RBC-ENTMCNC: 28.8 PG — SIGNIFICANT CHANGE UP (ref 27–34)
MCHC RBC-ENTMCNC: 32.6 GM/DL — SIGNIFICANT CHANGE UP (ref 32–36)
MCV RBC AUTO: 88.3 FL — SIGNIFICANT CHANGE UP (ref 80–100)
NRBC # BLD: 0 /100 WBCS — SIGNIFICANT CHANGE UP (ref 0–0)
PLATELET # BLD AUTO: 246 K/UL — SIGNIFICANT CHANGE UP (ref 150–400)
POTASSIUM SERPL-MCNC: 4.5 MMOL/L — SIGNIFICANT CHANGE UP (ref 3.5–5.3)
POTASSIUM SERPL-SCNC: 4.5 MMOL/L — SIGNIFICANT CHANGE UP (ref 3.5–5.3)
RBC # BLD: 4.86 M/UL — SIGNIFICANT CHANGE UP (ref 4.2–5.8)
RBC # FLD: 14 % — SIGNIFICANT CHANGE UP (ref 10.3–14.5)
SODIUM SERPL-SCNC: 136 MMOL/L — SIGNIFICANT CHANGE UP (ref 135–145)
WBC # BLD: 6.32 K/UL — SIGNIFICANT CHANGE UP (ref 3.8–10.5)
WBC # FLD AUTO: 6.32 K/UL — SIGNIFICANT CHANGE UP (ref 3.8–10.5)

## 2023-05-14 PROCEDURE — 86140 C-REACTIVE PROTEIN: CPT

## 2023-05-14 PROCEDURE — 73200 CT UPPER EXTREMITY W/O DYE: CPT

## 2023-05-14 PROCEDURE — 82962 GLUCOSE BLOOD TEST: CPT

## 2023-05-14 PROCEDURE — 83036 HEMOGLOBIN GLYCOSYLATED A1C: CPT

## 2023-05-14 PROCEDURE — 85610 PROTHROMBIN TIME: CPT

## 2023-05-14 PROCEDURE — 82550 ASSAY OF CK (CPK): CPT

## 2023-05-14 PROCEDURE — 73140 X-RAY EXAM OF FINGER(S): CPT

## 2023-05-14 PROCEDURE — 85730 THROMBOPLASTIN TIME PARTIAL: CPT

## 2023-05-14 PROCEDURE — 87040 BLOOD CULTURE FOR BACTERIA: CPT

## 2023-05-14 PROCEDURE — 96375 TX/PRO/DX INJ NEW DRUG ADDON: CPT

## 2023-05-14 PROCEDURE — 83605 ASSAY OF LACTIC ACID: CPT

## 2023-05-14 PROCEDURE — 85027 COMPLETE CBC AUTOMATED: CPT

## 2023-05-14 PROCEDURE — 85025 COMPLETE CBC W/AUTO DIFF WBC: CPT

## 2023-05-14 PROCEDURE — 85652 RBC SED RATE AUTOMATED: CPT

## 2023-05-14 PROCEDURE — 80048 BASIC METABOLIC PNL TOTAL CA: CPT

## 2023-05-14 PROCEDURE — 99285 EMERGENCY DEPT VISIT HI MDM: CPT

## 2023-05-14 PROCEDURE — 84100 ASSAY OF PHOSPHORUS: CPT

## 2023-05-14 PROCEDURE — 36415 COLL VENOUS BLD VENIPUNCTURE: CPT

## 2023-05-14 PROCEDURE — 80053 COMPREHEN METABOLIC PANEL: CPT

## 2023-05-14 PROCEDURE — 96374 THER/PROPH/DIAG INJ IV PUSH: CPT

## 2023-05-14 PROCEDURE — 83735 ASSAY OF MAGNESIUM: CPT

## 2023-05-14 RX ADMIN — Medication 81 MILLIGRAM(S): at 14:00

## 2023-05-14 RX ADMIN — ENOXAPARIN SODIUM 40 MILLIGRAM(S): 100 INJECTION SUBCUTANEOUS at 13:59

## 2023-05-14 RX ADMIN — AMLODIPINE BESYLATE 5 MILLIGRAM(S): 2.5 TABLET ORAL at 05:34

## 2023-05-14 NOTE — DISCHARGE NOTE PROVIDER - CARE PROVIDER_API CALL
Marcellus Killian  INTERNAL MEDICINE  [phone] [place] [address]  Phone: [phone]  Fax: [phone]  Established Patient  Follow Up Time: 1-3 days

## 2023-05-14 NOTE — PROGRESS NOTE ADULT - SUBJECTIVE AND OBJECTIVE BOX
Patient is a 83y old  Male who presents with a chief complaint of Index finger cellulitis (13 May 2023 06:17)    pt seen in icu [  ], reg med floor [  x ], bed [ x ], chair at bedside [   ], a+o x3 [x  ], lethargic [  ],    nad [ x ]      Allergies    No Known Allergies        Vitals    T(F): 97.4 (05-14-23 @ 05:25), Max: 97.6 (05-13-23 @ 11:18)  HR: 70 (05-14-23 @ 05:25) (67 - 74)  BP: 149/72 (05-14-23 @ 05:25) (131/94 - 149/72)  RR: 18 (05-14-23 @ 05:25) (18 - 18)  SpO2: 99% (05-14-23 @ 05:25) (99% - 100%)  Wt(kg): --  CAPILLARY BLOOD GLUCOSE      POCT Blood Glucose.: 187 mg/dL (13 May 2023 21:03)      Labs                          14.3   6.80  )-----------( 239      ( 13 May 2023 07:53 )             44.4       05-13    137  |  107  |  25<H>  ----------------------------<  136<H>  4.2   |  22  |  1.28    Ca    9.6      13 May 2023 07:53  Phos  3.4     05-12  Mg     2.2     05-12    TPro  7.9  /  Alb  3.6  /  TBili  0.6  /  DBili  x   /  AST  8<L>  /  ALT  15  /  AlkPhos  85  05-12            .Blood Blood-Peripheral  05-11 @ 23:09   No growth to date.  --  --      .Blood Blood-Peripheral  05-11 @ 23:00   No growth to date.  --  --          Radiology Results      Meds    MEDICATIONS  (STANDING):  amLODIPine   Tablet 5 milliGRAM(s) Oral daily  aspirin  chewable 81 milliGRAM(s) Oral daily  atorvastatin 20 milliGRAM(s) Oral at bedtime  dextrose 5%. 1000 milliLiter(s) (100 mL/Hr) IV Continuous <Continuous>  dextrose 5%. 1000 milliLiter(s) (50 mL/Hr) IV Continuous <Continuous>  dextrose 50% Injectable 25 Gram(s) IV Push once  dextrose 50% Injectable 12.5 Gram(s) IV Push once  dextrose 50% Injectable 25 Gram(s) IV Push once  enoxaparin Injectable 40 milliGRAM(s) SubCutaneous every 24 hours  glucagon  Injectable 1 milliGRAM(s) IntraMuscular once  insulin lispro (ADMELOG) corrective regimen sliding scale   SubCutaneous three times a day before meals  insulin lispro (ADMELOG) corrective regimen sliding scale   SubCutaneous at bedtime  vancomycin  IVPB 500 milliGRAM(s) IV Intermittent every 24 hours      MEDICATIONS  (PRN):  acetaminophen     Tablet .. 650 milliGRAM(s) Oral every 6 hours PRN Temp greater or equal to 38C (100.4F), Mild Pain (1 - 3)  aluminum hydroxide/magnesium hydroxide/simethicone Suspension 30 milliLiter(s) Oral every 4 hours PRN Dyspepsia  dextrose Oral Gel 15 Gram(s) Oral once PRN Blood Glucose LESS THAN 70 milliGRAM(s)/deciliter  melatonin 3 milliGRAM(s) Oral at bedtime PRN Insomnia  ondansetron Injectable 4 milliGRAM(s) IV Push every 8 hours PRN Nausea and/or Vomiting      Physical Exam      Neuro :  no focal deficits  Respiratory: CTA B/L  CV: RRR, S1S2, no murmurs,   Abdominal: Soft, NT, ND +BS,  Extremities: No edema, + peripheral pulses, right index finger erythematous, tender with pus      ASSESSMENT    right index finger cellulitis with possible abscess,   kena,   h/o HTN,   DM,   HLD  S/P cholecystectomy        PLAN    blood cx neg noted above  cont vanco,   id f/u  f/u wound cx   ct finger/ hand to r/o abscess   cont ivf  f/u labs   lispro ss,   hold metformin,   cont current meds          Patient is a 83y old  Male who presents with a chief complaint of Index finger cellulitis (13 May 2023 06:17)    pt seen in icu [  ], reg med floor [  x ], bed [ x ], chair at bedside [   ], a+o x3 [x  ], lethargic [  ],    nad [ x ]      Allergies    No Known Allergies        Vitals    T(F): 97.4 (05-14-23 @ 05:25), Max: 97.6 (05-13-23 @ 11:18)  HR: 70 (05-14-23 @ 05:25) (67 - 74)  BP: 149/72 (05-14-23 @ 05:25) (131/94 - 149/72)  RR: 18 (05-14-23 @ 05:25) (18 - 18)  SpO2: 99% (05-14-23 @ 05:25) (99% - 100%)  Wt(kg): --  CAPILLARY BLOOD GLUCOSE      POCT Blood Glucose.: 187 mg/dL (13 May 2023 21:03)      Labs                          14.3   6.80  )-----------( 239      ( 13 May 2023 07:53 )             44.4       05-13    137  |  107  |  25<H>  ----------------------------<  136<H>  4.2   |  22  |  1.28    Ca    9.6      13 May 2023 07:53  Phos  3.4     05-12  Mg     2.2     05-12    TPro  7.9  /  Alb  3.6  /  TBili  0.6  /  DBili  x   /  AST  8<L>  /  ALT  15  /  AlkPhos  85  05-12            .Blood Blood-Peripheral  05-11 @ 23:09   No growth to date.  --  --      .Blood Blood-Peripheral  05-11 @ 23:00   No growth to date.  --  --          Radiology Results      Meds    MEDICATIONS  (STANDING):  amLODIPine   Tablet 5 milliGRAM(s) Oral daily  aspirin  chewable 81 milliGRAM(s) Oral daily  atorvastatin 20 milliGRAM(s) Oral at bedtime  dextrose 5%. 1000 milliLiter(s) (100 mL/Hr) IV Continuous <Continuous>  dextrose 5%. 1000 milliLiter(s) (50 mL/Hr) IV Continuous <Continuous>  dextrose 50% Injectable 25 Gram(s) IV Push once  dextrose 50% Injectable 12.5 Gram(s) IV Push once  dextrose 50% Injectable 25 Gram(s) IV Push once  enoxaparin Injectable 40 milliGRAM(s) SubCutaneous every 24 hours  glucagon  Injectable 1 milliGRAM(s) IntraMuscular once  insulin lispro (ADMELOG) corrective regimen sliding scale   SubCutaneous three times a day before meals  insulin lispro (ADMELOG) corrective regimen sliding scale   SubCutaneous at bedtime  vancomycin  IVPB 500 milliGRAM(s) IV Intermittent every 24 hours      MEDICATIONS  (PRN):  acetaminophen     Tablet .. 650 milliGRAM(s) Oral every 6 hours PRN Temp greater or equal to 38C (100.4F), Mild Pain (1 - 3)  aluminum hydroxide/magnesium hydroxide/simethicone Suspension 30 milliLiter(s) Oral every 4 hours PRN Dyspepsia  dextrose Oral Gel 15 Gram(s) Oral once PRN Blood Glucose LESS THAN 70 milliGRAM(s)/deciliter  melatonin 3 milliGRAM(s) Oral at bedtime PRN Insomnia  ondansetron Injectable 4 milliGRAM(s) IV Push every 8 hours PRN Nausea and/or Vomiting      Physical Exam      Neuro :  no focal deficits  Respiratory: CTA B/L  CV: RRR, S1S2, no murmurs,   Abdominal: Soft, NT, ND +BS,  Extremities: No edema, + peripheral pulses, right index finger erythematous, tender improving      ASSESSMENT    right index finger cellulitis with possible abscess,   kena,   h/o HTN,   DM,   HLD  S/P cholecystectomy        PLAN    blood cx neg noted above  cont vanco,   id f/u  f/u wound cx   ct finger/ hand to r/o abscess   cont ivf  serum creat wnl   lispro ss,   hold metformin,   cont current meds

## 2023-05-14 NOTE — DISCHARGE NOTE NURSING/CASE MANAGEMENT/SOCIAL WORK - NSDCPEFALRISK_GEN_ALL_CORE
For information on Fall & Injury Prevention, visit: [email or web address] OR  [email or web address] OR  https://www.cdc.gov/steadi/patient.html

## 2023-05-14 NOTE — PROGRESS NOTE ADULT - SUBJECTIVE AND OBJECTIVE BOX
83y Male    Meds:  vancomycin  IVPB 500 milliGRAM(s) IV Intermittent every 24 hours    Allergies    No Known Allergies    Intolerances        VITALS:  Vital Signs Last 24 Hrs  T(C): 36.2 (14 May 2023 12:12), Max: 36.3 (14 May 2023 05:25)  T(F): 97.1 (14 May 2023 12:12), Max: 97.4 (14 May 2023 05:25)  HR: 70 (14 May 2023 12:12) (67 - 70)  BP: 140/69 (14 May 2023 12:12) (131/94 - 149/72)  BP(mean): --  RR: 16 (14 May 2023 12:12) (16 - 18)  SpO2: 100% (14 May 2023 12:12) (99% - 100%)    Parameters below as of 14 May 2023 12:12  Patient On (Oxygen Delivery Method): room air        LABS/DIAGNOSTIC TESTS:                          14.0   6.32  )-----------( 246      ( 14 May 2023 14:59 )             42.9         05-14    136  |  107  |  21<H>  ----------------------------<  269<H>  4.5   |  26  |  1.40<H>    Ca    9.1      14 May 2023 14:59            CULTURES: .Blood Blood-Peripheral  05-11 @ 23:09   No growth to date.  --  --      .Blood Blood-Peripheral  05-11 @ 23:00   No growth to date.  --  --            RADIOLOGY:      ROS:  [  ] UNABLE TO ELICIT 83y Male who is doing better, he has less right index finger swelling and less pain, he has no fevers or chills, no diarrhea or other complaints.    Meds:  vancomycin  IVPB 500 milliGRAM(s) IV Intermittent every 24 hours    Allergies    No Known Allergies    Intolerances        VITALS:  Vital Signs Last 24 Hrs  T(C): 36.2 (14 May 2023 12:12), Max: 36.3 (14 May 2023 05:25)  T(F): 97.1 (14 May 2023 12:12), Max: 97.4 (14 May 2023 05:25)  HR: 70 (14 May 2023 12:12) (67 - 70)  BP: 140/69 (14 May 2023 12:12) (131/94 - 149/72)  BP(mean): --  RR: 16 (14 May 2023 12:12) (16 - 18)  SpO2: 100% (14 May 2023 12:12) (99% - 100%)    Parameters below as of 14 May 2023 12:12  Patient On (Oxygen Delivery Method): room air        LABS/DIAGNOSTIC TESTS:                          14.0   6.32  )-----------( 246      ( 14 May 2023 14:59 )             42.9         05-14    136  |  107  |  21<H>  ----------------------------<  269<H>  4.5   |  26  |  1.40<H>    Ca    9.1      14 May 2023 14:59            CULTURES: .Blood Blood-Peripheral  05-11 @ 23:09   No growth to date.  --  --      .Blood Blood-Peripheral  05-11 @ 23:00   No growth to date.  --  --            RADIOLOGY:      ROS:  [  ] UNABLE TO ELICIT

## 2023-05-14 NOTE — DISCHARGE NOTE PROVIDER - NSDCCPCAREPLAN_GEN_ALL_CORE_FT
PRINCIPAL DISCHARGE DIAGNOSIS  Diagnosis: Cellulitis and abscess of finger of right hand  Assessment and Plan of Treatment: You were found to have an infection to your right index finger. Imaging of your rigt finger and hand was negative for abscess and negative for bone infection. You were placed on intravenous antibiotic and Infectious disease specialist was consulted. You should continue taking antibiotics as prescribed. If you develop fever, chills and malaise you should go to your nearest emergency room.      SECONDARY DISCHARGE DIAGNOSES  Diagnosis: Diabetes  Assessment and Plan of Treatment: Continue taking your Metformin and follow up with your primary care provider. Follow a diabetic diet.    Diagnosis: HTN (hypertension)  Assessment and Plan of Treatment: Your blood pressure was slightly elevated. Please continue to take your blood pressure medication as prescribed and follow up with you primary care provider.    Diagnosis: RODOLFO (acute kidney injury)  Assessment and Plan of Treatment: You were found to have acute kidney injury likely related to antibiotic. Stay hydrated.

## 2023-05-14 NOTE — DISCHARGE NOTE NURSING/CASE MANAGEMENT/SOCIAL WORK - PATIENT PORTAL LINK FT
You can access the FollowMyHealth Patient Portal offered by [facility] by registering at the following website: [email or web address]. By joining [employer]’s FollowMyHealth portal, you will also be able to view your health information using other applications (apps) compatible with our system.

## 2023-05-14 NOTE — DISCHARGE NOTE PROVIDER - HOSPITAL COURSE
Patient is an 83-year-old male PMH HTN, DM, HLD, who came in complaining of right index, finger, swelling, and purulent drainage. Patient states that eight days ago he injured the medial aspect of the right index finger while trying to close the deodorant, got injured by the cap. Since then, he developed a small cut that was associated with swelling throughout the forearm. Visited his PCP, who prescribed an antibiotic,  keflex and Bactroban ointment. Patient started taking the antibiotic two days ago, the swelling and redness around the arm reduce, however, the redness around his cut site persisted with small amount of purulent drainage which prompted his visit to the ED.   In the ED patient was found stable and afebrile.   Patient was admitted to the medical floor for management of cellulitis of right index finger. ID Dr. Bennett was consulted. Patient is on Vancomycin IV. CT Circumferential soft tissue swelling about the right index finger suggestive of cellulitis. No definite drainable fluid collection is seen, however evaluation   is limited by lack of intravenous contrast. No subcutaneous air. No CT evidence of osteomyelitis.   Patient was also found to have RODOLFO which has resolved.      Patient is an 83-year-old male PMH HTN, DM, HLD, who came in complaining of right index, finger, swelling, and purulent drainage. Patient states that eight days ago he injured the medial aspect of the right index finger while trying to close the deodorant, got injured by the cap. Since then, he developed a small cut that was associated with swelling throughout the forearm. Visited his PCP, who prescribed an antibiotic,  keflex and Bactroban ointment. Patient started taking the antibiotic two days ago, the swelling and redness around the arm reduce, however, the redness around his cut site persisted with small amount of purulent drainage which prompted his visit to the ED.   In the ED patient was found stable and afebrile.   Patient was admitted to the medical floor for management of cellulitis of right index finger. ID Dr. Bennett was consulted. Patient is on Vancomycin IV. CT Circumferential soft tissue swelling about the right index finger suggestive of cellulitis. No definite drainable fluid collection is seen, however evaluation is limited by lack of intravenous contrast. No subcutaneous air. No CT evidence of osteomyelitis.   Patient was also found to have RODOLFO which has resolved. Discussed with ID and plan is to discharge tomorrow on 10 days of Doxy. Patient to be discharged to home.   Discharge planning discussed with attending, ID and patient.     Given patient's improved clinical status and current hemodynamic stability, decision was made to discharge. Please note that this is a brief summary of hospital course. Please refer to daily progress notes and consult notes for full course and events.

## 2023-05-17 LAB
CULTURE RESULTS: SIGNIFICANT CHANGE UP
CULTURE RESULTS: SIGNIFICANT CHANGE UP
SPECIMEN SOURCE: SIGNIFICANT CHANGE UP
SPECIMEN SOURCE: SIGNIFICANT CHANGE UP

## 2023-06-20 RX ORDER — ATORVASTATIN CALCIUM 80 MG/1
1 TABLET, FILM COATED ORAL
Refills: 0 | DISCHARGE

## 2023-06-20 RX ORDER — AMLODIPINE BESYLATE 2.5 MG/1
1 TABLET ORAL
Refills: 0 | DISCHARGE

## 2023-06-20 RX ORDER — ASPIRIN/CALCIUM CARB/MAGNESIUM 324 MG
1 TABLET ORAL
Refills: 0 | DISCHARGE

## 2023-06-20 RX ORDER — METFORMIN HYDROCHLORIDE 850 MG/1
1 TABLET ORAL
Refills: 0 | DISCHARGE

## 2023-07-23 ENCOUNTER — EMERGENCY (EMERGENCY)
Facility: HOSPITAL | Age: 83
LOS: 1 days | Discharge: HOME | End: 2023-07-23
Attending: EMERGENCY MEDICINE
Payer: COMMERCIAL

## 2023-07-23 VITALS
HEART RATE: 95 BPM | DIASTOLIC BLOOD PRESSURE: 73 MMHG | OXYGEN SATURATION: 98 % | WEIGHT: 134.92 LBS | TEMPERATURE: 99 F | RESPIRATION RATE: 18 BRPM | HEIGHT: 61 IN | SYSTOLIC BLOOD PRESSURE: 172 MMHG

## 2023-07-23 DIAGNOSIS — Z90.49 ACQUIRED ABSENCE OF OTHER SPECIFIED PARTS OF DIGESTIVE TRACT: Chronic | ICD-10-CM

## 2023-07-23 PROBLEM — E78.5 HYPERLIPIDEMIA, UNSPECIFIED: Chronic | Status: ACTIVE | Noted: 2023-05-12

## 2023-07-23 PROBLEM — I10 ESSENTIAL (PRIMARY) HYPERTENSION: Chronic | Status: ACTIVE | Noted: 2023-05-12

## 2023-07-23 PROBLEM — E11.9 TYPE 2 DIABETES MELLITUS WITHOUT COMPLICATIONS: Chronic | Status: ACTIVE | Noted: 2023-05-12

## 2023-07-23 PROCEDURE — G1004: CPT

## 2023-07-23 PROCEDURE — 72125 CT NECK SPINE W/O DYE: CPT | Mod: MG

## 2023-07-23 PROCEDURE — 70450 CT HEAD/BRAIN W/O DYE: CPT | Mod: MG

## 2023-07-23 PROCEDURE — 70450 CT HEAD/BRAIN W/O DYE: CPT | Mod: 26,MG

## 2023-07-23 PROCEDURE — 72125 CT NECK SPINE W/O DYE: CPT | Mod: 26,MG

## 2023-07-23 PROCEDURE — 99284 EMERGENCY DEPT VISIT MOD MDM: CPT | Mod: 25

## 2023-07-23 PROCEDURE — 90471 IMMUNIZATION ADMIN: CPT

## 2023-07-23 PROCEDURE — 72131 CT LUMBAR SPINE W/O DYE: CPT | Mod: MG

## 2023-07-23 PROCEDURE — 90715 TDAP VACCINE 7 YRS/> IM: CPT

## 2023-07-23 PROCEDURE — 99284 EMERGENCY DEPT VISIT MOD MDM: CPT

## 2023-07-23 PROCEDURE — 72131 CT LUMBAR SPINE W/O DYE: CPT | Mod: 26,MG

## 2023-07-23 RX ORDER — ACETAMINOPHEN 500 MG
650 TABLET ORAL ONCE
Refills: 0 | Status: COMPLETED | OUTPATIENT
Start: 2023-07-23 | End: 2023-07-23

## 2023-07-23 RX ORDER — TETANUS TOXOID, REDUCED DIPHTHERIA TOXOID AND ACELLULAR PERTUSSIS VACCINE, ADSORBED 5; 2.5; 8; 8; 2.5 [IU]/.5ML; [IU]/.5ML; UG/.5ML; UG/.5ML; UG/.5ML
0.5 SUSPENSION INTRAMUSCULAR ONCE
Refills: 0 | Status: COMPLETED | OUTPATIENT
Start: 2023-07-23 | End: 2023-07-23

## 2023-07-23 RX ADMIN — TETANUS TOXOID, REDUCED DIPHTHERIA TOXOID AND ACELLULAR PERTUSSIS VACCINE, ADSORBED 0.5 MILLILITER(S): 5; 2.5; 8; 8; 2.5 SUSPENSION INTRAMUSCULAR at 12:06

## 2023-07-23 RX ADMIN — Medication 650 MILLIGRAM(S): at 12:06
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
## 2023-07-23 NOTE — ED ADULT NURSE NOTE - NSFALLHARMRISKINTERV_ED_ALL_ED
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
## 2023-07-23 NOTE — ED PROVIDER NOTE - PATIENT PORTAL LINK FT
You can access the FollowMyHealth Patient Portal offered by [facility] by registering at the following website: [email or web address]. By joining [employer]’s FollowMyHealth portal, you will also be able to view your health information using other applications (apps) compatible with our system.

## 2023-07-23 NOTE — ED ADULT NURSE NOTE - LOCATION
11/20/19 2100   Treatment Plan   Plan Type Updated Plan   Goals   Patient Reported Goal #1 \"to learn how to control my feelings better\"   Pt Reported Goal #1 Type Long Term Goal   Goal #1 Progression Outcome Not Met - continue to monitor   Goal #2 reduction in suicidal thoughts and safe detox   Goal #2 Type Short Term Goal   Goal #2 Progression Outcome Not Met - continue to monitor   Additional Medical Outcomes Pain   Pain Outcome Acceptable pain/comfort level is achieved/maintained.   Pain Outcome Progression Outcome Met - continue evaluating goal progress toward completion   Focus Indicators   Indicators Anxiety;Restlessness;Racing Thoughts;Urge To Harm Self;Suicidal Ideation;Sleep Problems;Urges to Use Alcohol/Drugs;Poor Judgement/Impulsive Behavior   Patient Objectives   Objectives Patient will demonstrate compliance with and knowledge of medications;Patient will develop and practice 2 new coping skills to manage symtoms   OT/Art Therapy Objectives Not applicable   Therapy Interventions   Interventions Discuss role medications play in symptom management;Encourage group attendance to learn and reinforce positive coping strategies;Explore possible coping alternatives including risks and benefits of choices   OT/Art Therapy Interventions Not applicable   Treatment Plan Agreement   Patient has reviewed and agrees to the above treatment plan Yes     Patient has been visible on unit all shift except when he was resting in his room.  Denied S/I, H/I, A/V/H.  PRN's given Hydroxyzine 50 mg at 1605 and Ambien 5 mg at 2111 with good effect.  Received 3 pieces of Nicorette Gum.  Will continue to monitor for safety.   right knee/arm

## 2023-07-23 NOTE — ED ADULT NURSE NOTE - ED STAT RN HANDOFF DETAILS
Report given to ROMÁN GUARDADO Pt resting in bed, no acute distress noted, denies chest pain, no SOB noted.

## 2023-07-23 NOTE — ED ADULT NURSE NOTE - ED STAT RN HANDOFF DETAILS 2
pt discharged to home as per MD order. All discharge instructions and follow-up visits provided to pt, pt verbalized understanding, leaving ambulatory in no acute distress

## 2023-09-19 NOTE — ED PROVIDER NOTE - ATTESTATION, MLM
I have reviewed and confirmed nurses' notes for patient's medications, allergies, medical history, and surgical history. Olanzapine Counseling- I discussed with the patient the common side effects of olanzapine including but are not limited to: lack of energy, dry mouth, increased appetite, sleepiness, tremor, constipation, dizziness, changes in behavior, or restlessness.  Explained that teenagers are more likely to experience headaches, abdominal pain, pain in the arms or legs, tiredness, and sleepiness.  Serious side effects include but are not limited: increased risk of death in elderly patients who are confused, have memory loss, or dementia-related psychosis; hyperglycemia; increased cholesterol and triglycerides; and weight gain.

## 2023-11-14 NOTE — ED ADULT NURSE NOTE - NS ED NURSE PATIENT LEFT UNIT TIME
